# Patient Record
Sex: MALE | Race: WHITE | NOT HISPANIC OR LATINO | Employment: FULL TIME | URBAN - METROPOLITAN AREA
[De-identification: names, ages, dates, MRNs, and addresses within clinical notes are randomized per-mention and may not be internally consistent; named-entity substitution may affect disease eponyms.]

---

## 2020-02-06 ENCOUNTER — HOSPITAL ENCOUNTER (EMERGENCY)
Facility: CLINIC | Age: 45
Discharge: HOME | End: 2020-02-06
Attending: EMERGENCY MEDICINE | Admitting: EMERGENCY MEDICINE
Payer: COMMERCIAL

## 2020-02-06 VITALS
WEIGHT: 180 LBS | TEMPERATURE: 99.4 F | DIASTOLIC BLOOD PRESSURE: 87 MMHG | BODY MASS INDEX: 26.58 KG/M2 | SYSTOLIC BLOOD PRESSURE: 134 MMHG | RESPIRATION RATE: 20 BRPM | OXYGEN SATURATION: 96 %

## 2020-02-06 DIAGNOSIS — J10.1 INFLUENZA A: ICD-10-CM

## 2020-02-06 LAB
FLUAV+FLUBV AG SPEC QL: NEGATIVE
FLUAV+FLUBV AG SPEC QL: POSITIVE
SPECIMEN SOURCE: ABNORMAL

## 2020-02-06 PROCEDURE — 99283 EMERGENCY DEPT VISIT LOW MDM: CPT | Performed by: EMERGENCY MEDICINE

## 2020-02-06 PROCEDURE — 87804 INFLUENZA ASSAY W/OPTIC: CPT | Mod: 59 | Performed by: EMERGENCY MEDICINE

## 2020-02-06 PROCEDURE — 99284 EMERGENCY DEPT VISIT MOD MDM: CPT | Mod: Z6 | Performed by: EMERGENCY MEDICINE

## 2020-02-06 RX ORDER — BENZONATATE 200 MG/1
200 CAPSULE ORAL 3 TIMES DAILY PRN
Qty: 20 CAPSULE | Refills: 0 | Status: SHIPPED | OUTPATIENT
Start: 2020-02-06 | End: 2023-05-17

## 2020-02-06 NOTE — LETTER
February 6, 2020      To Whom It May Concern:      Ad Stewart was seen in our Emergency Department today, 02/06/20.  I expect his condition to improve over the next 4-5 days.  He may return to work when improved.    Sincerely,        Davidscott Rushing MD

## 2020-02-06 NOTE — DISCHARGE INSTRUCTIONS
Encourage fluids.  Tessalon Perls as needed for cough.  Consider a nasal decongestant.  You can take ibuprofen 800 mg 3 times a day for body aches and fever.  You can take Tylenol 1000 mg 4 times a day for fever or body aches.

## 2020-02-06 NOTE — ED PROVIDER NOTES
History     Chief Complaint   Patient presents with     Generalized Body Aches     The history is provided by the patient.     Ad Stewart is a 44 year old male who presents to the emergency department for generalized body aches. Patient reports starting to feel body aches around 1430 yesterday while at work. He states he felt disoriented, headache, stomach pain, sore throat, cough, congestion and fatigue since then. He states he felt better last night, then woke up this morning feeling worse. He denies any ear pain, vomiting or diarrhea. He did not get a flu shot this year. He denies ever having pneumonia or bronchitis. Patient notes that he does have a cracked tooth in the lower left jaw and wants to make sure he doesn't have a bacterial infection from that.    Allergies:  No Known Allergies    Problem List:    Patient Active Problem List    Diagnosis Date Noted     Opiate addiction (H) 07/22/2010     Priority: Medium     Anxiety 07/07/2010     Priority: Medium     Insomnia 07/07/2010     Priority: Medium     FHx: migraine headaches 07/07/2010     Priority: Medium     mom          Past Medical History:    History reviewed. No pertinent past medical history.    Past Surgical History:    History reviewed. No pertinent surgical history.    Family History:    No family history on file.    Social History:  Marital Status:   [2]  Social History     Tobacco Use     Smoking status: Current Every Day Smoker     Packs/day: 0.50     Years: 10.00     Pack years: 5.00     Types: Cigarettes   Substance Use Topics     Alcohol use: No     Drug use: No        Medications:    benzonatate (TESSALON) 200 MG capsule  CELEXA 20 MG PO TABS  IBUPROFEN 600 MG PO TABS  IBUPROFEN 800 MG PO TABS  IMITREX 100 MG PO TABS  INDERAL LA# 80 MG OR CP24  NEURONTIN 300 MG PO CAPS  TRAZODONE HCL 50 MG PO TABS  VISTARIL 25 MG PO CAPS  VISTARIL 50 MG PO CAPS          Review of Systems   All other systems reviewed and are  negative.      Physical Exam   BP: 134/87  Heart Rate: 109  Temp: 99.4  F (37.4  C)  Resp: 20  Weight: 81.6 kg (180 lb)  SpO2: 96 %      Physical Exam  Vitals signs and nursing note reviewed.       General alert cooperative male in mild to moderate stress.  HEENT reveals the ears to be clear.  Eyes show no injection.  Nasal passages are swollen.  Clear discharge.  Orally there is postnasal drip but no tonsillar hypertrophy.  Neck is supple without adenopathy.  Lungs had no adventitious sounds but he did have a coarse cough during eval.  Mildly tachycardic on cardiac auscultation.  A benign abdomen exam.    ED Course        Procedures               Critical Care time:  none               Results for orders placed or performed during the hospital encounter of 02/06/20 (from the past 24 hour(s))   Influenza A/B antigen   Result Value Ref Range    Influenza A/B Agn Specimen Nasopharyngeal     Influenza A Positive (A) NEG^Negative    Influenza B Negative NEG^Negative       Medications - No data to display    Assessments & Plan (with Medical Decision Making)   Ad Stewart is a 44 year old male who presents to the emergency department for generalized body aches. Patient reports starting to feel body aches around 1430 yesterday while at work. He states he felt disoriented, headache, stomach pain, sore throat, cough, congestion and fatigue since then. He states he felt better last night, then woke up this morning feeling worse. He denies any ear pain, vomiting or diarrhea. He did not get a flu shot this year. He denies ever having pneumonia or bronchitis. Patient notes that he does have a cracked tooth in the lower left jaw and wants to make sure he doesn't have a bacterial infection from that.  On presentation patient was mildly tachycardic but not hypotensive.  Is not hypoxic.  He had nasal swelling with postnasal drip.  No adventitious lung sounds.  Coarse cough.  Influenza A was positive.  Information on influenza is  provided.  Push fluids.  Tylenol / ibuprofen for fever or pain.  Tessalon to suppress cough.  Reasons to return for reassessment discussed.  I have reviewed the nursing notes.    I have reviewed the findings, diagnosis, plan and need for follow up with the patient.       New Prescriptions    BENZONATATE (TESSALON) 200 MG CAPSULE    Take 1 capsule (200 mg) by mouth 3 times daily as needed for cough       Final diagnoses:   Influenza A     This document serves as a record of services personally performed by David Rushing MD. It was created on their behalf by Shonda Baugh, a trained medical scribe. The creation of this record is based on the provider's personal observations and the statements of the patient. This document has been checked and approved by the attending provider.    Note: Chart documentation done in part with Dragon Voice Recognition software. Although reviewed after completion, some word and grammatical errors may remain.    2/6/2020   [facility] EMERGENCY DEPARTMENT     David Rushing MD  02/06/20 0570       David Rushing MD  02/06/20 2741

## 2020-02-06 NOTE — ED TRIAGE NOTES
Started having body aches yesterday around 2 pm. Does have a cracked tooth in the lower left jaw and just wants to make sure this is not a bacterial infection.

## 2020-02-06 NOTE — ED AVS SNAPSHOT
[facility] Emergency Department  [address] [place] [address]  Phone:  [phone]  Fax:  [phone]                                    Ad Stewart   MRN: [id number]    Department:  [facility] Emergency Department   Date of Visit:  2/6/2020           After Visit Summary Signature Page    I have received my discharge instructions, and my questions have been answered. I have discussed any challenges I see with this plan with the nurse or doctor.    ..........................................................................................................................................  Patient/Patient Representative Signature      ..........................................................................................................................................  Patient Representative Print Name and Relationship to Patient    ..................................................               ................................................  Date                                   Time    ..........................................................................................................................................  Reviewed by Signature/Title    ...................................................              ..............................................  Date                                               Time          22EPIC Rev 08/18

## 2020-02-06 NOTE — ED NOTES
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  No flu shot.

## 2020-03-30 ENCOUNTER — VIRTUAL VISIT (OUTPATIENT)
Dept: FAMILY MEDICINE | Facility: OTHER | Age: 45
End: 2020-03-30

## 2020-03-30 NOTE — PROGRESS NOTES
"Date: 2020 14:35:01  Clinician: Diandra Abernathy  Clinician NPI: [id number]  Patient: Ad Stewart  Patient : 1975  Patient Address: [address], [place] [address]  Patient Phone: [phone]  Visit Protocol: URI  Patient Summary:  Ad is a 44 year old ( : 1975 ) male who initiated a Visit for COVID-19 (Coronavirus) evaluation and screening. When asked the question \"Please sign me up to receive news, health information and promotions from [employer].\", Ad responded \"Yes\".   A synchronous visit is necessary because the patient reported the following abnormal symptoms:   Recent facial or sinus surgery (requires clarification)   Ad states his symptoms started 1-2 days ago.   His symptoms consist of rhinitis, tooth pain, myalgia, wheezing, a cough, nasal congestion, and malaise.   Symptom details     Nasal secretions: The color of his mucus is clear.    Cough: Ad coughs a few times an hour and his cough is not more bothersome at night. Phlegm comes into his throat when he coughs. He does not believe his cough is caused by post-nasal drip. The color of the phlegm is clear and white.     Wheezing: Ad has not ever been diagnosed with asthma. The wheezing does not interfere with his normal daily activities.    Tooth pain: The tooth pain is caused by a cavity, recent dental work, or other mouth problems.      Ad denies having headache, fever, facial pain or pressure, chills, sore throat, enlarged lymph nodes, and ear pain. He also denies taking antibiotic medication for the symptoms. He is not experiencing dyspnea.   Precipitating events  He has not recently been exposed to someone with influenza. Ad has been in close contact with the following high risk individuals: children under the age of 5, people with asthma, heart disease or diabetes, and immunocompromised people.   Pertinent COVID-19 (Coronavirus) information  Ad has not traveled internationally or to the areas where COVID-19 " (Coronavirus) is widespread, including cruise ship travel in the last 14 days before the start of his symptoms.   Ad has not had a close contact with a laboratory-confirmed COVID-19 patient within 14 days of symptom onset. He also has not had a close contact with a suspected COVID-19 patient within 14 days of symptom onset.   Ad is not a healthcare worker or a  and does not work in a healthcare facility. He does not live with a healthcare worker.   Pertinent medical history  Ad needs a return to work/school note.   Weight: 190 lbs   Ad smokes or uses smokeless tobacco.   Weight: 190 lbs    MEDICATIONS: No current medications, ALLERGIES: NKDA  Clinician Response:  Dear Ad,   Based on the information you have provided, you do have symptoms that are consistent with Coronavirus (COVID-19).   The coronavirus causes mild to severe respiratory illness with the most common symptoms including fever, cough and difficulty breathing. Unfortunately, many viruses cause similar symptoms and it can be difficult to distinguish between viruses, especially in mild cases, so we are presuming that anyone with cough or fever has coronavirus at this time.  Coronavirus/COVID-19 has reached the point of community spread in [place], meaning that we are finding the virus in people with no known exposure risk for juany the virus. Given the increasing commonness of coronavirus in the community we are no longer testing patients who are not critically ill.  If you are a health care worker, you should refer to your employee health office for instructions about testing and returning to work.  For everyone else who has cough or fever, you should assume you are infected with coronavirus. Since you will not be tested but have symptoms that may be consistent with coronavirus, the CDC recommends you stay in self-isolation until these three things have happened:    You have had no fever for at least 72 hours (that is  three full days of no fever without the use of medicine that reduces fevers)    AND   Other symptoms have improved (for example, when your cough or shortness of breath have improved)   AND   At least 7 days have passed since your symptoms first appeared.   How to Isolate:    Isolate yourself at home.   Do Not allow any visitors  Do Not go to work or school  Do Not go to Islam,  centers, shopping, or other public places.  Do Not shake hands.  Avoid close contact with others (hugging, kissing).   Protect Others:    Cover Your Mouth and Nose with a mask, disposable tissue or wash cloth to avoid spreading germs to others.  Wash your hands and face frequently with soap and water.   Managing Symptoms:    At this time, we primarily recommend Tylenol (Acetaminophen) for fever or pain. If you have liver or kidney problems, contact your primary care provider for instructions on use of tylenol. Adults can take 650 mg (two 325 mg pills) by mouth every 4-6 hours as needed OR 1,000 mg (two 500 mg pills) every 8 hours as needed. MAXIMUM DAILY DOSE: 3,000mg. For children, refer to dosing on bottle based on age or weight.   If you develop significant shortness of breath that prevents you from doing normal activities, please call 911 or proceed to the nearest emergency room and alert them immediately that you have been in self-isolation for possible coronavirus.   If you have a higher risk medical condition such as cancer, heart failure, end stage renal disease on dialysis or have a transplant, please reach out to your specialist's clinic to advise them of your [facility] visit should you not improve within the next two days.  For more information about COVID19 and options for caring for yourself at home, please visit the CDC website at https://www.cdc.gov/coronavirus/2019-ncov/about/steps-when-sick.htmlFor more options for care at [facility], please visit our website at [email or web address]      Diagnosis: Coronavirus infection, unspecified  Diagnosis ICD: B34.2  Triage Notes: I reviewed the patient's history, verified their identity, and explained the Visit process.    Discussed case with pt.  Had tooth extraction 1 month ago.  Symptoms improved.  Still slight pain in this area.  Denies purulent discharge/fevers/chills.  Synchronous Triage: phone, status: completed, duration: 261 seconds

## 2020-04-03 ENCOUNTER — VIRTUAL VISIT (OUTPATIENT)
Dept: FAMILY MEDICINE | Facility: OTHER | Age: 45
End: 2020-04-03

## 2020-04-03 NOTE — PROGRESS NOTES
"Date: 2020 14:50:42  Clinician: Chio Sen  Clinician NPI: [id number]  Patient: Ad Stewart  Patient : 1975  Patient Address: [address], [place] [address]  Patient Phone: [phone]  Visit Protocol: URI  Patient Summary:  Ad is a 44 year old ( : 1975 ) male who initiated a Visit for COVID-19 (Coronavirus) evaluation and screening. When asked the question \"Please sign me up to receive news, health information and promotions from [employer].\", Ad responded \"No\".   A synchronous visit is necessary because the patient reported the following abnormal symptoms:   Recent facial or sinus surgery (requires clarification)   Ad states his symptoms started gradually 3-6 days ago.   His symptoms consist of nausea, a cough, nasal congestion, malaise, rhinitis, and myalgia.   Symptom details     Nasal secretions: The color of his mucus is clear and white.    Cough: Ad coughs a few times an hour and his cough is not more bothersome at night. Phlegm comes into his throat when he coughs. He does not believe his cough is caused by post-nasal drip. The color of the phlegm is white and clear.      Ad denies having diarrhea, vomiting, teeth pain, headache, fever, wheezing, sore throat, ear pain, enlarged lymph nodes, chills, and facial pain or pressure. He also denies double sickening (worsening symptoms after initial improvement) and taking antibiotic medication for the symptoms. He is not experiencing dyspnea.   Precipitating events  He has not recently been exposed to someone with influenza. Ad has been in close contact with the following high risk individuals: children under the age of 5.   Pertinent COVID-19 (Coronavirus) information  Ad has not traveled internationally or to the areas where COVID-19 (Coronavirus) is widespread, including cruise ship travel in the last 14 days before the start of his symptoms.   Ad has not had a close contact with a laboratory-confirmed COVID-19 " patient within 14 days of symptom onset. He also has not had a close contact with a suspected COVID-19 patient within 14 days of symptom onset.   Ad does not work or volunteer as healthcare worker or a  and does not work or volunteer in a healthcare facility. He does not live with a healthcare worker.   Pertinent medical history  Ad needs a return to work/school note.   Weight: 190 lbs   Ad smokes or uses smokeless tobacco.   Weight: 190 lbs    MEDICATIONS: No current medications, ALLERGIES: NKDA  Clinician Response:  Dear Ad,    Based on the information you have provided, you do have symptoms that are consistent with Coronavirus (COVID-19).  The coronavirus causes mild to severe respiratory illness with the most common symptoms including fever, cough and difficulty breathing. Unfortunately, many viruses cause similar symptoms and it can be difficult to distinguish between viruses, especially in mild cases, so we are presuming that anyone with cough or fever has coronavirus at this time.  Coronavirus/COVID-19 has reached the point of community spread in [place], meaning that we are finding the virus in people with no known exposure risk for juany the virus. Given the increasing commonness of coronavirus in the community we are no longer testing patients who are not critically ill.  If you are a health care worker, you should refer to your employee health office for instructions about testing and returning to work.  For everyone else who has cough or fever, you should assume you are infected with coronavirus. Since you will not be tested but have symptoms that may be consistent with coronavirus, the CDC recommends you stay in self-isolation until these three things have happened:    You have had no fever for at least 72 hours (that is three full days of no fever without the use of medicine that reduces fevers)    AND   Other symptoms have improved (for example, when your cough or  shortness of breath have improved)   AND   At least 7 days have passed since your symptoms first appeared.   How to Isolate:   Isolate yourself at home.  Do Not allow any visitors  Do Not go to work or school  Do Not go to [employer],  centers, shopping, or other public places.  Do Not shake hands.  Avoid close contact with others (hugging, kissing).   Protect Others:   Cover Your Mouth and Nose with a mask, disposable tissue or wash cloth to avoid spreading germs to others.  Wash your hands and face frequently with soap and water.   We know it can be scary to hear that you might have COVID-19. Our team can help track your symptoms and make sure you are doing ok over the next two weeks using a program called [employer] to keep in touch. When you receive an email from [employer], please consider enrolling in our monitoring program. There is no cost to you for monitoring. Here is a URL where you can learn more: http://www.[email or web address]/[email or web address]  Managing Symptoms:   At this time, we primarily recommend Tylenol (Acetaminophen) for fever or pain. If you have liver or kidney problems, contact your primary care provider for instructions on use of tylenol. Adults can take 650 mg (two 325 mg pills) by mouth every 4-6 hours as needed OR 1,000 mg (two 500 mg pills) every 8 hours as needed. MAXIMUM DAILY DOSE: 3,000mg. For children, refer to dosing on bottle based on age or weight.   If you develop significant shortness of breath that prevents you from doing normal activities, please call 911 or proceed to the nearest emergency room and alert them immediately that you have been in self-isolation for possible coronavirus.  If you have a higher risk medical condition such as cancer, heart failure, end stage renal disease on dialysis or have a transplant, please reach out to your specialist's clinic to advise them of your [facility] visit should you not improve within the next two days.   For more information about COVID19  and options for caring for yourself at home, please visit the CDC website at https://www.cdc.gov/coronavirus/2019-ncov/about/steps-when-sick.htmlFor more options for care at [facility], please visit our website at [email or web address]      COVID-19 (Coronavirus) General Information  With the increase in the number of COVID-19 (Coronavirus) cases, we understand you may have some questions. Below is some helpful information on COVID-19 (Coronavirus).  How can I protect myself and others from the COVID-19 (Coronavirus)?  Because there is currently no vaccine to prevent infection, the best way to protect yourself is to avoid being exposed to this virus. Put distance between yourself and other people if COVID-19 (Coronavirus) is spreading in your community. The virus is thought to spread mainly from person-to-person.     Between people who are in close contact with one another (within about 6 about) for a prolonged period (10 minutes or longer).    Through respiratory droplets produced when an infected person coughs or sneezes.     The CDC recommends the following additional steps to protect yourself and others:     Wash your hands often with soap and water for at least 20 seconds, especially after blowing your nose, coughing, or sneezing; going to the bathroom; and before eating or preparing food.  Use an alcohol-based hand  that contains at least 60 percent alcohol if soap and water are not available.        Avoid touching your eyes, nose and mouth with unwashed hands.    Avoid close contact with people who are sick.    Stay home when you are sick.    Cover your cough or sneeze with a tissue, then throw the tissue in the trash.    Clean and disinfect frequently touched objects and surfaces.     You can help stop COVID-19 (Coronavirus) by knowing the signs and symptoms:     Fever    Cough    Shortness of breath     Contact your healthcare provider if   Develop symptoms   AND    Have been in close contact with a person known to have COVID-19 (Coronavirus) or live in or have recently traveled from an area with ongoing spread of COVID-19 (Coronavirus). Call ahead before you go to a doctor's office or emergency room. Tell them about your recent travel and your symptoms.   For the most up to date information, visit the CDC's website.  Self-monitoring  Self-monitoring means people should monitor themselves for fever by taking their temperatures twice a day and remain alert for a cough or difficulty breathing.  It is important to check your health two times each day for 14 days after a potential exposure to a person with COVID-19 (Coronavirus) or after travel from a location where COVID-19 (Coronavirus) is widespread. If you have been exposed to a person with COVID-19 (Coronavirus), it may take up to 14 days to know if you will get sick. Follow the steps below to check and record your health.     Take your temperature with a thermometer twice a day, once in the morning and once in the evening, and watch for a cough or difficulty breathing for 14 days.    Write down your temperature and any COVID-19 symptoms you may have: feeling feverish, coughing, or difficulty breathing.    Stay home from work or school.    Do not take public transportation, taxis, or ride-shares.    Avoid crowded places (such as shopping centers and movie theaters) and limit your activities in public.    Keep your distance from others (about 6 feet or 2 meters).    If you get sick with fever, cough, or trouble breathing, contact your healthcare provider and tell them about your recent travel and/or your symptoms.    If you need to seek medical care for other reasons, such as dialysis, call ahead to your doctor and tell them about your recent travel.     Steps to help prevent the spread of COVID-19 (Coronavirus) if you are sick  If you are sick with COVID-19 (Coronavirus) or suspect you are infected with the virus that causes  "COVID-19 (Coronavirus), follow the steps below to help prevent the disease from spreading&nbsp;to people in your home and community.     Stay home except to get medical care. Home isolation may be started in consultation with your healthcare clinician.    Separate yourself from other people and animals in your home.    Call ahead before visiting your doctor if you have a medical appointment.    Wear a facemask when you are around other people.    Cover your cough and sneezes.    Clean your hands often.    Avoid sharing personal household items.    Clean and disinfect frequently touched objects and surfaces everyday.    You will need to have someone drop off medications or household supplies (if needed) at your house without coming inside or in contact with you or others living in your house.    Monitor your symptoms and seek prompt medical care if your illness is worsening (e.g. Difficulty breathing).    Discontinue home isolation only in consultation with your healthcare provider.     For more detailed and up to date information on what to do if you are sick, visit this link: What to Do If You Are Sick With COVID-19.  Do I need to be tested for COVID-19 (Coronavirus)?     Not everyone needs to be tested for COVID-19 (Coronavirus). Decisions on which patients receive testing will be based on the local spread of COVID-19 (Coronavirus) as well as the symptoms. Your healthcare provider will make the final decision on whether you should be tested.    In the meantime, if you have concerns that you may have been exposed, it is reasonable to practice \"social distancing.\"&nbsp; If you are ill with a cold or flu-like illness, please monitor your symptoms and call your healthcare provider if your symptoms worsen.    For more up to date information, visit this link: COVID-19 (Coronavirus) Frequently Asked Questions and Answers.      Diagnosis: Coronavirus infection, unspecified  Diagnosis ICD: B34.2  Triage Notes: I reviewed " the patient's history, verified their identity, and explained the Visit process.    Phone call triggered due to recent surgery, reports he had a tooth extraction over a month ago and this has completely healed and has no issues from this. He says his symptoms are really about the same as his previous [facility] visit on 3/30. He does need clarification for work of when he can return. He is not having any fevers but does feel achy, nauseated, and has a cough. He is really tired but has been home this week from work and trying to rest. He says his cough does not wake him up. He has not tried anything for his symptoms because he does not like to take meds. He has felt nauseated off and on and offered to prescribe something for this and he says he already has something at home and does not feel it is needed at this time. I did discuss that he can use some over the counter cough and cold meds as well as take Tylenol for his symptoms. He feels he is doing pretty well managing his symptoms. we did discuss when to go to urgent care, emergency room and when to revisit [facility] if further symptoms develop.  Synchronous Triage: phone, status: completed, duration: 333 seconds  Prescription: acetaminophen (Tylenol) 325 mg oral tablet 8 tablet, 0 days supply. Take 2 tablets by mouth every 6 hours as needed for pain, headache, fever. Refills: 0, Refill as needed: no, Allow substitutions: yes  Prescription: dextromethorphan-guaifenesin (Mucus Relief DM)  mg oral tablet 14 tablet, 7 days supply. Take 1 tablet 2 times per day for 7 days as needed for cough. Refills: 0, Refill as needed: no, Allow substitutions: yes

## 2020-07-07 DIAGNOSIS — Z11.59 SCREENING FOR VIRAL DISEASE: ICD-10-CM

## 2020-08-15 ENCOUNTER — APPOINTMENT (OUTPATIENT)
Dept: GENERAL RADIOLOGY | Facility: CLINIC | Age: 45
End: 2020-08-15
Attending: FAMILY MEDICINE
Payer: COMMERCIAL

## 2020-08-15 ENCOUNTER — HOSPITAL ENCOUNTER (EMERGENCY)
Facility: CLINIC | Age: 45
Discharge: HOME | End: 2020-08-15
Attending: FAMILY MEDICINE | Admitting: FAMILY MEDICINE
Payer: COMMERCIAL

## 2020-08-15 VITALS
TEMPERATURE: 97 F | OXYGEN SATURATION: 98 % | SYSTOLIC BLOOD PRESSURE: 118 MMHG | HEART RATE: 65 BPM | RESPIRATION RATE: 24 BRPM | DIASTOLIC BLOOD PRESSURE: 84 MMHG

## 2020-08-15 DIAGNOSIS — S20.211A RIB CONTUSION, RIGHT, INITIAL ENCOUNTER: ICD-10-CM

## 2020-08-15 DIAGNOSIS — R07.89 CHEST WALL PAIN: ICD-10-CM

## 2020-08-15 DIAGNOSIS — M62.838 MUSCLE SPASM: ICD-10-CM

## 2020-08-15 PROCEDURE — 99284 EMERGENCY DEPT VISIT MOD MDM: CPT | Mod: 25 | Performed by: FAMILY MEDICINE

## 2020-08-15 PROCEDURE — 99284 EMERGENCY DEPT VISIT MOD MDM: CPT | Mod: Z6 | Performed by: FAMILY MEDICINE

## 2020-08-15 PROCEDURE — 25000132 ZZH RX MED GY IP 250 OP 250 PS 637: Performed by: FAMILY MEDICINE

## 2020-08-15 PROCEDURE — 71101 X-RAY EXAM UNILAT RIBS/CHEST: CPT | Mod: TC,RT

## 2020-08-15 PROCEDURE — 71045 X-RAY EXAM CHEST 1 VIEW: CPT | Mod: TC

## 2020-08-15 RX ORDER — IBUPROFEN 600 MG/1
600 TABLET, FILM COATED ORAL ONCE
Status: COMPLETED | OUTPATIENT
Start: 2020-08-15 | End: 2020-08-15

## 2020-08-15 RX ORDER — ACETAMINOPHEN 500 MG
1000 TABLET ORAL ONCE
Status: COMPLETED | OUTPATIENT
Start: 2020-08-15 | End: 2020-08-15

## 2020-08-15 RX ADMIN — ACETAMINOPHEN 1000 MG: 500 TABLET, FILM COATED ORAL at 05:29

## 2020-08-15 RX ADMIN — IBUPROFEN 600 MG: 600 TABLET ORAL at 05:29

## 2020-08-15 NOTE — ED TRIAGE NOTES
Reports using a nail gun about a week ago and held it up to his chest for leverage and it knocked the wind out of him. States he has been having increased pain and tonight he is SOB and cant take in a deep breath.

## 2020-08-15 NOTE — DISCHARGE INSTRUCTIONS
Ice 20 minutes per hour, (20 minutes on, 40 minutes off) at least 4 times a day.  Ibuprofen 600 mg and Tylenol 1000 mg every 6 hours as needed for pain.  You can take them at the same time.  Take with food so the Ibuprofen doesn't upset your stomach.  Recheck in clinic if persistent problems.  Return to the ED if you develop a fever over 100.4, coughing up blood, increasing shortness of breath, blistery rash or any concerns.  It was a pleasure visiting with you this morning.  I am glad you did not pop your lung and hope you feel better soon.    Thank you for choosing [facility]. We appreciate the opportunity to meet your urgent medical needs. Please let us know if we could have done anything to make your stay more satisfying.    After discharge, please closely monitor for any new or worsening symptoms. Return to the Emergency Department if you develop any acute worsening signs or symptoms.    If you had lab work, cultures or imaging studies done during your stay, the final results may still be pending. We will call you if your plan of care needs to change. However, if you are not improving as expected, please follow up with your primary care provider or clinic.     Start any prescription medications that were prescribed to you and take them as directed.     Please see additional handouts that may be pertinent to your condition.

## 2020-08-15 NOTE — ED PROVIDER NOTES
History     Chief Complaint   Patient presents with     Shortness of Breath     HPI  Ad Stewart is a 44 year old male who presents to the ED this morning with shortness of breath.  He was using a nail gun about a week ago placing a truss at a friend's cabin.  He was on a ladder and leaning up against the butt end of the gun to get some leverage and fired the nail into the truss.  The kickback knocked the wind out of him.  He continued on and has been working all week.  He has had some pain with movement but it really escalated tonight and it became more short of breath.        Allergies:  No Known Allergies    Problem List:    Patient Active Problem List    Diagnosis Date Noted     Opiate addiction (H) 07/22/2010     Priority: Medium     Anxiety 07/07/2010     Priority: Medium     Insomnia 07/07/2010     Priority: Medium     FHx: migraine headaches 07/07/2010     Priority: Medium     mom          Past Medical History:    History reviewed. No pertinent past medical history.    Past Surgical History:    History reviewed. No pertinent surgical history.    Family History:    History reviewed. No pertinent family history.    Social History:  Marital Status:   [2]  Social History     Tobacco Use     Smoking status: Current Every Day Smoker     Packs/day: 1.00     Years: 10.00     Pack years: 10.00     Types: Cigarettes     Smokeless tobacco: Never Used   Substance Use Topics     Alcohol use: No     Drug use: No        Medications:    IBUPROFEN 800 MG PO TABS  benzonatate (TESSALON) 200 MG capsule  IBUPROFEN 600 MG PO TABS  IMITREX 100 MG PO TABS  INDERAL LA# 80 MG OR CP24          Review of Systems    Physical Exam   BP: 126/86  Pulse: 78  Heart Rate: 81  Temp: 97  F (36.1  C)  Resp: 24  SpO2: 100 %      Physical Exam  Constitutional:       General: He is in acute distress ( moderate).      Appearance: He is well-developed.   Cardiovascular:      Rate and Rhythm: Normal rate and regular rhythm.   Pulmonary:       Effort: Respiratory distress (splinting) present.      Breath sounds: No decreased breath sounds.      Comments: Equal breath sounds  Chest:      Chest wall: Tenderness ( moderate reproducible over right anterior chest, around 6-7th rib) present.   Abdominal:      Palpations: Abdomen is soft.      Tenderness: There is no abdominal tenderness.   Musculoskeletal: Normal range of motion.   Skin:     General: Skin is warm and dry.   Neurological:      General: No focal deficit present.      Mental Status: He is alert and oriented to person, place, and time.   Psychiatric:         Mood and Affect: Mood normal.         ED Course        Procedures               Critical Care time:  none               Results for orders placed or performed during the hospital encounter of 08/15/20 (from the past 24 hour(s))   XR Chest Port 1 View    Narrative    EXAM: XR CHEST PORT 1 VW  LOCATION: [facility]  DATE/TIME: 8/15/2020 2:36 AM    INDICATION: Shortness of breath  COMPARISON: None.      Impression    IMPRESSION: Negative chest.   Ribs XR, unilat 3 views + PA chest, right    Narrative    EXAM: XR RIBS and CHEST RT 3VW  LOCATION: [facility]  DATE/TIME: 8/15/2020 3:37 AM    INDICATION: Right anterior rib pain  COMPARISON: 08/15/2020      Impression    IMPRESSION: Stable cardiomediastinal silhouette. Mild basilar atelectasis. No vascular congestion or pleural effusion. No visible, displaced right rib fracture.       Medications   ibuprofen (ADVIL/MOTRIN) tablet 600 mg (600 mg Oral Given 8/15/20 0529)   acetaminophen (TYLENOL) tablet 1,000 mg (1,000 mg Oral Given 8/15/20 0529)       Assessments & Plan (with Medical Decision Making)  44-year-old gentleman was on a ladder using a nail gun to secure a truss his friend's cabin about a week ago.  He pushed on the nail gun with his chest to get some added pressure.  He fired the nail into the truss and the nail gun kicked back normally into his right chest.   It knocked the wind out of him but he continued on and work the rest of the week.  Has had some discomfort with movement and deep inspiration but it acutely worsened tonight and became quite short of breath.  He was seen immediately in room 6.  He had equal breath sounds but was splinting and there was not much of a sliding sign on quick look ultrasound.  Stat portable chest x-ray was obtained and showed no evidence of pneumothorax.  He is much more comfortable without any intervention.  Will get right rib detail.  Point tender over the right anterior chest just under the pec.    Portable chest x-ray showed no evidence of a pneumothorax and no infiltrate.  Right rib x-rays showed no displaced rib fractures.  He did realize some relief with some Tylenol and ibuprofen orally.  He has point tenderness over the right anterior chest wall.  I suspect that he bruised his ribs and probably has some bruising in the intercostal muscles along with some spasm.  Fortunately he did not develop a pneumothorax.  Doubt gallbladder etiology.  I did a quick look limited bedside ultrasound and he has no free fluid in his abdomen.  There is no right upper quadrant tenderness to palpation.  No rash to suggest zoster but I did ask him to follow-up if he develops a blistery rash.  Verbal and written discharge instructions given.  Patient is comfortable with this plan.  Tylenol/ibuprofen for pain.  He did not want anything stronger       I have reviewed the nursing notes.    I have reviewed the findings, diagnosis, plan and need for follow up with the patient.       New Prescriptions    No medications on file       Final diagnoses:   Chest wall pain   Rib contusion, right, initial encounter   Muscle spasm - right intercostals       8/15/2020   [facility] EMERGENCY DEPARTMENT     Jeremias Nair MD  08/15/20 0557

## 2020-08-15 NOTE — ED NOTES
Pt anxious when he arrived. Unable to get a full set of vital signs. Provider in room with pt upon arrival. Pt is now calm and resting on cot with HOB elevated. Pts wife in room at this time.

## 2020-08-15 NOTE — ED AVS SNAPSHOT
[facility] Emergency Department  [address] [place] [address]  Phone:  [phone]  Fax:  [phone]                                    Ad Stewart   MRN: [id number]    Department:  [facility] Emergency Department   Date of Visit:  8/15/2020           After Visit Summary Signature Page    I have received my discharge instructions, and my questions have been answered. I have discussed any challenges I see with this plan with the nurse or doctor.    ..........................................................................................................................................  Patient/Patient Representative Signature      ..........................................................................................................................................  Patient Representative Print Name and Relationship to Patient    ..................................................               ................................................  Date                                   Time    ..........................................................................................................................................  Reviewed by Signature/Title    ...................................................              ..............................................  Date                                               Time          22EPIC Rev 08/18

## 2023-05-17 ENCOUNTER — OFFICE VISIT (OUTPATIENT)
Dept: INTERNAL MEDICINE | Facility: CLINIC | Age: 48
End: 2023-05-17
Payer: COMMERCIAL

## 2023-05-17 ENCOUNTER — ANCILLARY PROCEDURE (OUTPATIENT)
Dept: GENERAL RADIOLOGY | Facility: CLINIC | Age: 48
End: 2023-05-17
Attending: INTERNAL MEDICINE
Payer: COMMERCIAL

## 2023-05-17 VITALS
SYSTOLIC BLOOD PRESSURE: 142 MMHG | HEART RATE: 72 BPM | TEMPERATURE: 97.8 F | WEIGHT: 207.3 LBS | OXYGEN SATURATION: 99 % | DIASTOLIC BLOOD PRESSURE: 90 MMHG

## 2023-05-17 DIAGNOSIS — M54.50 ACUTE LEFT-SIDED LOW BACK PAIN WITHOUT SCIATICA: ICD-10-CM

## 2023-05-17 DIAGNOSIS — M54.50 ACUTE LEFT-SIDED LOW BACK PAIN WITHOUT SCIATICA: Primary | ICD-10-CM

## 2023-05-17 PROCEDURE — 72100 X-RAY EXAM L-S SPINE 2/3 VWS: CPT | Mod: TC | Performed by: RADIOLOGY

## 2023-05-17 PROCEDURE — 99203 OFFICE O/P NEW LOW 30 MIN: CPT | Performed by: INTERNAL MEDICINE

## 2023-05-17 RX ORDER — METHOCARBAMOL 750 MG/1
750 TABLET, FILM COATED ORAL 3 TIMES DAILY PRN
Qty: 30 TABLET | Refills: 0 | Status: SHIPPED | OUTPATIENT
Start: 2023-05-17

## 2023-05-17 RX ORDER — METHYLPREDNISOLONE 4 MG
TABLET, DOSE PACK ORAL
Qty: 21 TABLET | Refills: 0 | Status: SHIPPED | OUTPATIENT
Start: 2023-05-17

## 2023-05-17 RX ORDER — LAMOTRIGINE 100 MG/1
TABLET ORAL
COMMUNITY
Start: 2022-12-15 | End: 2023-05-17

## 2023-05-17 RX ORDER — IBUPROFEN 800 MG/1
800 TABLET, FILM COATED ORAL
COMMUNITY

## 2023-05-17 NOTE — PROGRESS NOTES
Assessment & Plan     Acute left-sided low back pain without sciatica  X-rays of lumbar spine today are unremarkable.  Suspect mostly muscular pain.  Trial of Medrol Dosepak as more potent anti-inflammatory, also offered nonsedating muscle relaxant.  Did offer PT referral, patient wishes to observe for now on the above therapy but may pursue in the future.  - XR Lumbar Spine 2/3 Views; Future  - methylPREDNISolone (MEDROL DOSEPAK) 4 MG tablet therapy pack; Follow Package Directions  - methocarbamol (ROBAXIN) 750 MG tablet; Take 1 tablet (750 mg) by mouth 3 times daily as needed for muscle spasms                 Francis Roldan MD  [facility]          uFad Duong is a 47 year old, presenting for the following health issues:  Back Pain    History of Present Illness       Back Pain:  He presents for follow up of back pain. Patient's back pain is a recurring problem.  Location of back pain:  Left lower back and left side of waist  Description of back pain: burning, cramping, dull ache, sharp, shooting and stabbing  Back pain spreads: left buttocks and left thigh    Since patient first noticed back pain, pain is: gradually worsening  Does back pain interfere with his job:  Yes      He eats 2-3 servings of fruits and vegetables daily.He consumes 2 sweetened beverage(s) daily.He exercises with enough effort to increase his heart rate 30 to 60 minutes per day.  He exercises with enough effort to increase his heart rate 6 days per week.   He is taking medications regularly.         Past history of chronic back pain, but has been quiescent for last few years.  For last 3 to 4 days however, has had localized right-sided low back pain, without any lower extremity radiculopathy.  Worse with certain position changes, especially lumbar flexion, and resisted extension.  Intermittent doses of ibuprofen, not helpful.      Review of Systems   Constitutional, HEENT, cardiovascular,  pulmonary, gi and gu systems are negative, except as otherwise noted.      Objective    BP (!) 142/90   Pulse 72   Temp 97.8  F (36.6  C) (Temporal)   Wt 94 kg (207 lb 4.8 oz)   SpO2 99%   There is no height or weight on file to calculate BMI.  Physical Exam   GENERAL: healthy, alert and no distress  NECK: no adenopathy, no asymmetry, masses, or scars and thyroid normal to palpation  RESP: lungs clear to auscultation - no rales, rhonchi or wheezes  CV: regular rate and rhythm, normal S1 S2, no S3 or S4, no murmur, click or rub, no peripheral edema and peripheral pulses strong  ABDOMEN: soft, nontender, no hepatosplenomegaly, no masses and bowel sounds normal  MS: Pain and palpable spasm in left lumbar paraspinous musculature.  Pain is worsened with resisted lumbar tension.  NEURO: Strength and reflexes are normal bilaterally